# Patient Record
Sex: FEMALE | Race: WHITE
[De-identification: names, ages, dates, MRNs, and addresses within clinical notes are randomized per-mention and may not be internally consistent; named-entity substitution may affect disease eponyms.]

---

## 2018-07-09 ENCOUNTER — HOSPITAL ENCOUNTER (EMERGENCY)
Dept: HOSPITAL 33 - ED | Age: 63
Discharge: HOME | End: 2018-07-09
Payer: COMMERCIAL

## 2018-07-09 VITALS — HEART RATE: 73 BPM | SYSTOLIC BLOOD PRESSURE: 154 MMHG | DIASTOLIC BLOOD PRESSURE: 94 MMHG

## 2018-07-09 VITALS — OXYGEN SATURATION: 95 %

## 2018-07-09 DIAGNOSIS — E78.00: ICD-10-CM

## 2018-07-09 DIAGNOSIS — E03.9: ICD-10-CM

## 2018-07-09 DIAGNOSIS — E11.9: ICD-10-CM

## 2018-07-09 DIAGNOSIS — Z79.899: ICD-10-CM

## 2018-07-09 DIAGNOSIS — N13.2: Primary | ICD-10-CM

## 2018-07-09 LAB
ALBUMIN SERPL-MCNC: 4.6 G/DL (ref 3.5–5)
ALP SERPL-CCNC: 65 U/L (ref 38–126)
ALT SERPL-CCNC: 21 U/L (ref 0–35)
ANION GAP SERPL CALC-SCNC: 17.8 MEQ/L (ref 5–15)
AST SERPL QL: 22 U/L (ref 14–36)
BASOPHILS # BLD AUTO: 0.06 10*3/UL (ref 0–0.4)
BASOPHILS NFR BLD AUTO: 0.6 % (ref 0–0.4)
BILIRUB BLD-MCNC: 0.4 MG/DL (ref 0.2–1.3)
BUN SERPL-MCNC: 18 MG/DL (ref 7–17)
CALCIUM SPEC-MCNC: 9.8 MG/DL (ref 8.4–10.2)
CHLORIDE SERPL-SCNC: 104 MMOL/L (ref 98–107)
CO2 SERPL-SCNC: 25 MMOL/L (ref 22–30)
CREAT SERPL-MCNC: 1.09 MG/DL (ref 0.52–1.04)
EOSINOPHIL # BLD AUTO: 0.25 10*3/UL (ref 0–0.5)
GLUCOSE SERPL-MCNC: 166 MG/DL (ref 74–106)
GLUCOSE UR-MCNC: NEGATIVE MG/DL
GRANULOCYTES # BLD AUTO: 7.48 10*3/UL (ref 1.4–6.9)
HCT VFR BLD AUTO: 41 % (ref 35–47)
HGB BLD-MCNC: 13.8 GM/DL (ref 12–16)
LIPASE SERPL-CCNC: 67 U/L (ref 23–300)
LYMPHOCYTES # SPEC AUTO: 1.65 10*3/UL (ref 1–4.6)
MCH RBC QN AUTO: 28.9 PG (ref 26–32)
MCHC RBC AUTO-ENTMCNC: 33.7 G/DL (ref 32–36)
MONOCYTES # BLD AUTO: 0.84 10*3/UL (ref 0–1.3)
NEUTROPHILS NFR BLD AUTO: 72.7 % (ref 36–66)
PLATELET # BLD AUTO: 214 K/MM3 (ref 150–450)
POTASSIUM SERPLBLD-SCNC: 3.7 MMOL/L (ref 3.5–5.1)
PROT SERPL-MCNC: 7.6 G/DL (ref 6.3–8.2)
PROT UR STRIP-MCNC: (no result) MG/DL
RBC # BLD AUTO: 4.77 M/MM3 (ref 4.1–5.4)
SODIUM SERPL-SCNC: 143 MMOL/L (ref 137–145)
WBC # BLD AUTO: 10.3 K/MM3 (ref 4–10.5)

## 2018-07-09 PROCEDURE — 84484 ASSAY OF TROPONIN QUANT: CPT

## 2018-07-09 PROCEDURE — 96374 THER/PROPH/DIAG INJ IV PUSH: CPT

## 2018-07-09 PROCEDURE — 74176 CT ABD & PELVIS W/O CONTRAST: CPT

## 2018-07-09 PROCEDURE — 83690 ASSAY OF LIPASE: CPT

## 2018-07-09 PROCEDURE — 36415 COLL VENOUS BLD VENIPUNCTURE: CPT

## 2018-07-09 PROCEDURE — 36000 PLACE NEEDLE IN VEIN: CPT

## 2018-07-09 PROCEDURE — 96375 TX/PRO/DX INJ NEW DRUG ADDON: CPT

## 2018-07-09 PROCEDURE — 99284 EMERGENCY DEPT VISIT MOD MDM: CPT

## 2018-07-09 PROCEDURE — 80053 COMPREHEN METABOLIC PANEL: CPT

## 2018-07-09 PROCEDURE — 93005 ELECTROCARDIOGRAM TRACING: CPT

## 2018-07-09 PROCEDURE — 87186 SC STD MICRODIL/AGAR DIL: CPT

## 2018-07-09 PROCEDURE — 85025 COMPLETE CBC W/AUTO DIFF WBC: CPT

## 2018-07-09 PROCEDURE — 87086 URINE CULTURE/COLONY COUNT: CPT

## 2018-07-09 PROCEDURE — 87077 CULTURE AEROBIC IDENTIFY: CPT

## 2018-07-09 PROCEDURE — 81000 URINALYSIS NONAUTO W/SCOPE: CPT

## 2018-07-09 NOTE — ERPHSYRPT
- History of Present Illness


Time Seen by Provider: 07/09/18 15:45


Historian: patient


Patient Subjective Stated Complaint: pt reports rosio 30 min ago sudden onset of 

right sided abd pain-denies vomiting but states that she feels like she could 

but doesn't usually vomit-denies difficulty with urination or bowels


Triage Nursing Assessment: pt pink warm and dry-alert-ambulatory with no 

difficulty-abd soft and tender to palp-pt states pain does not change rather 

there is palp or not-denies rebound tenderness-resp easy and nonlabored


Physician History: 





mild to mod rlq ache pain for one hour, nonrad, no injury, no fever, no emesis


Allergies/Adverse Reactions: 








codeine Allergy (Mild, Verified 07/09/18 15:33)


 Hives





Home Medications: 








Adalimumab [Humira Pen] 0.8 ml IM WEEKLY 07/09/18 [History]


Aspirin 81 gm Chew*** [Baby Aspirin 81 mg Chew***] 81 mg PO DAILY 07/09/18 [

History]


Levothyroxine Sodium [Synthroid] 125 mcg PO DAILY 07/09/18 [History]


Loratadine 10 mg PO DAILY 07/09/18 [History]


Losartan/Hydrochlorothiazide [Losartan-Hctz 50-12.5 mg Tab] 1 tab PO DAILY 07/09 /18 [History]


Metoprolol Tartrate 25 mg PO DAILY 07/09/18 [History]


Pravastatin Sodium 40 mg PO DAILY 07/09/18 [History]





Hx Tetanus, Diphtheria Vaccination/Date Given: No


Hx Influenza Vaccination/Date Given: Yes


Hx Pneumococcal Vaccination/Date Given: No


Immunizations Up to Date: Yes





- Review of Systems


Constitutional: No Fever


Eyes: No Eye Redness


Ears, Nose, & Throat: No Mouth Pain


Respiratory: No Dyspnea


Cardiac: No Chest Pain


Abdominal/Gastrointestinal: Abdominal Pain, No Vomiting


Genitourinary Symptoms: No Dysuria


Musculoskeletal: No Back Pain


Skin: No Rash


Neurological: No Headache





- Past Medical History


Pertinent Past Medical History: Yes


Cardiac History: High Cholesterol, Other


Endocrine Medical History: Diabetes Type II, Hypothyroidism





- Past Surgical History


Past Surgical History: Yes


Female Surgical History: Hysterectomy





- Social History


Smoking Status: Never smoker


Exposure to second hand smoke: No


Drug Use: none


Patient Lives Alone: No





- Female History


Hx Last Menstrual Period: hysterectomy


Hx Pregnant Now: No





- Nursing Vital Signs


Nursing Vital Signs: 


 Initial Vital Signs











Temperature  98.9 F   07/09/18 15:29


 


Pulse Rate  87   07/09/18 15:29


 


Respiratory Rate  18   07/09/18 15:29


 


Blood Pressure  169/94   07/09/18 15:29


 


O2 Sat by Pulse Oximetry  94 L  07/09/18 15:29








 Pain Scale











Pain Intensity                 4

















- Physical Exam


General Appearance: no apparent distress


Eye Exam: PERRL/EOMI, eyes nml inspection


Ears, Nose, Throat Exam: moist mucous membranes


Neck Exam: normal inspection


Respiratory Exam: normal breath sounds


Cardiovascular Exam: regular rate/rhythm


Gastrointestinal/Abdomen Exam: soft, tenderness, No distention, No pulsatile 

mass, No rebound


Extremity Exam: normal inspection


Neurologic Exam: alert, oriented x 3, cooperative


Skin Exam: warm, dry


**SpO2 Interpretation**: normal


SpO2: 94


Oxygen Delivery: Room Air





- Course


Nursing assessment & vital signs reviewed: Yes


EKG Interpreted by Me: Other (nsr 76, no stemi)





- CT Exams


  ** Abdomen/Pelvis


CT Interpretation: Discussed w/radiologist, Other (right hydronephrosis and 

1.5mm stone at right uvj)


Ordered Tests: 


 Active Orders 24 hr











 Category Date Time Status


 


 EKG-ER Only STAT Care  07/09/18 15:47 Active


 


 IV Insertion STAT Care  07/09/18 15:43 Active


 


 ABDOMEN AND PELVIS W/0 CONTRAS [CT] Stat Exams  07/09/18 15:43 Completed


 


 CBC W DIFF Stat Lab  07/09/18 16:18 Completed


 


 CMP Stat Lab  07/09/18 16:18 Completed


 


 CULTURE,URINE Stat Lab  07/09/18 16:02 Received


 


 LIPASE Stat Lab  07/09/18 16:18 Completed


 


 TROPONIN Q3H Lab  07/09/18 16:18 Completed


 


 TROPONIN Q3H Lab  07/09/18 19:00 Ordered


 


 TROPONIN Q3H Lab  07/09/18 22:00 Ordered


 


 UA W/ MICROSCOPIC Stat Lab  07/09/18 16:02 Completed








Medication Summary














Discontinued Medications














Generic Name Dose Route Start Last Admin





  Trade Name Freq  PRN Reason Stop Dose Admin


 


Sodium Chloride  1,000 mls @ 999 mls/hr  07/09/18 15:52  07/09/18 16:02





  Sodium Chloride 0.9% 1000 Ml  IV  07/09/18 16:52  999 mls/hr





  .Q1H1M STA   Administration





     





     





     





     


 


Sodium Chloride  Confirm  07/09/18 15:53  





  Sodium Chloride 0.9% 1000 Ml  Administered  07/09/18 15:54  





  Dose   





  1,000 mls @ ud   





  .ROUTE   





  .STK-MED ONE   





     





     





     





     


 


Ketorolac Tromethamine  30 mg  07/09/18 15:43  07/09/18 16:02





  Toradol 30 Mg Injection***  IV  07/09/18 15:44  30 mg





  STAT ONE   Administration





     





     





     





     


 


Ketorolac Tromethamine  Confirm  07/09/18 15:52  





  Toradol 30 Mg Injection***  Administered  07/09/18 15:53  





  Dose   





  30 mg   





  .ROUTE   





  .STK-MED ONE   





     





     





     





     


 


Ondansetron HCl  4 mg  07/09/18 15:52  07/09/18 16:02





  Zofran 4 Mg/2 Ml Vial**  IV  07/09/18 15:53  4 mg





  STAT ONE   Administration





     





     





     





     


 


Ondansetron HCl  Confirm  07/09/18 15:52  





  Zofran 4 Mg/2 Ml Vial**  Administered  07/09/18 15:53  





  Dose   





  4 mg   





  .ROUTE   





  .STK-MED ONE   





     





     





     





     











Lab/Rad Data: 


 Laboratory Result Diagrams





 07/09/18 16:18 





 07/09/18 16:18 





 Laboratory Results











  07/09/18 07/09/18 07/09/18 Range/Units





  16:18 16:18 16:18 


 


WBC    10.3  (4.0-10.5)  K/mm3


 


RBC    4.77  (4.1-5.4)  M/mm3


 


Hgb    13.8  (12.0-16.0)  gm/dl


 


Hct    41.0  (35-47)  %


 


MCV    86.0  ()  fl


 


MCH    28.9  (26-32)  pg


 


MCHC    33.7  (32-36)  g/dl


 


RDW    13.9  (11.5-14.0)  %


 


Plt Count    214  (150-450)  K/mm3


 


MPV    10.4 H  (6-9.5)  fl


 


Gran %    72.7 H  (36.0-66.0)  %


 


Eos # (Auto)    0.25  (0-0.5)  


 


Absolute Lymphs (auto)    1.65  (1.0-4.6)  


 


Absolute Monos (auto)    0.84  (0.0-1.3)  


 


Lymphocytes %    16.1 L  (24.0-44.0)  %


 


Monocytes %    8.2  (0.0-12.0)  %


 


Eosinophils %    2.4  (0.00-5.0)  %


 


Basophils %    0.6  (0.0-0.4)  %


 


Absolute Granulocytes    7.48 H  (1.4-6.9)  


 


Basophils #    0.06  (0-0.4)  


 


Sodium   143   (137-145)  mmol/L


 


Potassium   3.7   (3.5-5.1)  mmol/L


 


Chloride   104   ()  mmol/L


 


Carbon Dioxide   25   (22-30)  mmol/L


 


Anion Gap   17.8 H   (5-15)  MEQ/L


 


BUN   18 H   (7-17)  mg/dL


 


Creatinine   1.09 H   (0.52-1.04)  mg/dL


 


Estimated GFR   54.1   ML/MIN


 


Glucose   166 H   ()  mg/dL


 


Calcium   9.8   (8.4-10.2)  mg/dL


 


Total Bilirubin   0.40   (0.2-1.3)  mg/dL


 


AST   22   (14-36)  U/L


 


ALT   21   (0-35)  U/L


 


Alkaline Phosphatase   65   ()  U/L


 


Troponin I  < 0.012    (0.000-0.034)  ng/mL


 


Serum Total Protein   7.6   (6.3-8.2)  g/dL


 


Albumin   4.6   (3.5-5.0)  g/dL


 


Lipase   67   ()  U/L


 


Ur Collection Type     


 


Urine Color     (YELLOW)  


 


Urine Appearance     (CLEAR)  


 


Urine pH     (5-6)  


 


Ur Specific Gravity     (1.005-1.025)  


 


Urine Protein     (Negative)  


 


Urine Ketones     (NEGATIVE)  


 


Urine Blood     (0-5)  Aniket/ul


 


Urine Nitrite     (NEGATIVE)  


 


Urine Bilirubin     (NEGATIVE)  


 


Urine Urobilinogen     (0-1)  mg/dL


 


Ur Leukocyte Esterase     (NEGATIVE)  


 


Urine Microscopic RBC     (0-2)  /HPF


 


Urine Microscopic WBC     (0-5)  /HPF


 


Ur Epithelial Cells     (FEW)  /HPF


 


Urine Bacteria     (NEGATIVE)  /HPF


 


Urine Culture Reflexed     (NO)  


 


Urine Glucose     (NEGATIVE)  mg/dL


 


Specimen Received     














  07/09/18 Range/Units





  16:02 


 


WBC   (4.0-10.5)  K/mm3


 


RBC   (4.1-5.4)  M/mm3


 


Hgb   (12.0-16.0)  gm/dl


 


Hct   (35-47)  %


 


MCV   ()  fl


 


MCH   (26-32)  pg


 


MCHC   (32-36)  g/dl


 


RDW   (11.5-14.0)  %


 


Plt Count   (150-450)  K/mm3


 


MPV   (6-9.5)  fl


 


Gran %   (36.0-66.0)  %


 


Eos # (Auto)   (0-0.5)  


 


Absolute Lymphs (auto)   (1.0-4.6)  


 


Absolute Monos (auto)   (0.0-1.3)  


 


Lymphocytes %   (24.0-44.0)  %


 


Monocytes %   (0.0-12.0)  %


 


Eosinophils %   (0.00-5.0)  %


 


Basophils %   (0.0-0.4)  %


 


Absolute Granulocytes   (1.4-6.9)  


 


Basophils #   (0-0.4)  


 


Sodium   (137-145)  mmol/L


 


Potassium   (3.5-5.1)  mmol/L


 


Chloride   ()  mmol/L


 


Carbon Dioxide   (22-30)  mmol/L


 


Anion Gap   (5-15)  MEQ/L


 


BUN   (7-17)  mg/dL


 


Creatinine   (0.52-1.04)  mg/dL


 


Estimated GFR   ML/MIN


 


Glucose   ()  mg/dL


 


Calcium   (8.4-10.2)  mg/dL


 


Total Bilirubin   (0.2-1.3)  mg/dL


 


AST   (14-36)  U/L


 


ALT   (0-35)  U/L


 


Alkaline Phosphatase   ()  U/L


 


Troponin I   (0.000-0.034)  ng/mL


 


Serum Total Protein   (6.3-8.2)  g/dL


 


Albumin   (3.5-5.0)  g/dL


 


Lipase   ()  U/L


 


Ur Collection Type  CLEAN CATCH  


 


Urine Color  YELLOW  (YELLOW)  


 


Urine Appearance  CLEAR  (CLEAR)  


 


Urine pH  5.0  (5-6)  


 


Ur Specific Gravity  1.020  (1.005-1.025)  


 


Urine Protein  TRACE  (Negative)  


 


Urine Ketones  NEGATIVE  (NEGATIVE)  


 


Urine Blood  250  (0-5)  Aniket/ul


 


Urine Nitrite  NEGATIVE  (NEGATIVE)  


 


Urine Bilirubin  NEGATIVE  (NEGATIVE)  


 


Urine Urobilinogen  NORMAL  (0-1)  mg/dL


 


Ur Leukocyte Esterase  1+  (NEGATIVE)  


 


Urine Microscopic RBC  10-15  (0-2)  /HPF


 


Urine Microscopic WBC  10-15  (0-5)  /HPF


 


Ur Epithelial Cells  MODERATE  (FEW)  /HPF


 


Urine Bacteria  FEW  (NEGATIVE)  /HPF


 


Urine Culture Reflexed  YES  (NO)  


 


Urine Glucose  NEGATIVE  (NEGATIVE)  mg/dL


 


Specimen Received  7/9/18 1600  














- Progress


Progress: improved


Progress Note: 





07/09/18 17:49


see your doctor, return if worse


Counseled pt/family regarding: lab results, diagnosis, need for follow-up, rad 

results





- Departure


Time of Disposition: 17:49


Departure Disposition: Home


Clinical Impression: 


Hydronephrosis


Qualifiers:


 Hydronephrosis type: with renal calculous obstruction Qualified Code(s): N13.2 

- Hydronephrosis with renal and ureteral calculous obstruction





Condition: Stable


Critical Care Time: No


Referrals: 


CLARIBEL ALEXANDRE NP [Primary Care Provider] - 


Additional Instructions: 


call your doctor for Urology referral


Prescriptions: 


Ondansetron ODT 4 MG*** [Zofran Odt 4 mg] 1 tab PO Q6H PRN PRN #10 tab.rapdis


 PRN Reason: Nausea/Vomiting


Cephalexin Mh 500 mg** [Keflex 500 mg] 1 cap PO QID #40 capsule


Tamsulosin HCl 0.4 mg*** [Flomax 0.4 MG] 1 cap PO HS #7 cap

## 2018-07-09 NOTE — XRAY
Exam: CT of the abdomen and pelvis without IV contrast from 7/9/2018.

       CTDI:  26.74



Comparison: None.



Indication: Right lower quadrant/flank pain, nausea and vomiting, symptoms

started this afternoon.  The patient is status post hysterectomy.



Technique: Non-IV contrast axial images were obtained through the abdomen and

pelvis.  Reconstructed coronal and sagittal images were created and reviewed.



Findings: The lung bases reveals some minimal compression atelectatic changes

at the posterior lateral left lung base.  No active lung disease is seen.

However, there appears to be a small pericardial effusion, best seen on axial

images #3 and #4.  Correlate clinically.



The right kidney reveals some asymmetric mild hydronephrosis and hydroureter

down to the ureterovesical junction.  Interestingly, a stone is not evident

seen on the axial images, but I do detect a tiny approximately 1.5 mm in

diameter obstructing stone within the distal right ureter at the

ureterovesical junction on coronal image #97 and sagittal image #62.  These

findings are consistent with acute obstructive uropathy.  The left ureter

appears unremarkable.



The remainder of the right kidney is remarkable for a 3.0 cm cyst within the

lower pole.  I also note a.  2.45 cm in diameter cyst at the lateral margin of

lower pole of the left kidney and a small 0.7 cm in diameter apparent

hyperdense cyst at the lower pole of the left kidney.  No other renal or

ureteral calculi are seen.  The urinary bladder is almost empty.



The liver is remarkable for prominent inferior extension of the right lobe

(Riedel's lobe) representing a normal variant.  No definite focal hepatic mass

or intrahepatic biliary duct distention is seen.  Assessment of the solid

organs is limited on a non-IV contrast study only.  The gallbladder is

partially contracted.  No dense calcifications are seen within the gallbladder.



The spleen measures 13.2 cm in greatest length on sagittal image #128.  This

is borderline enlarged.  No focal splenic mass is seen.  Secretion/fluid is

seen within the stomach.  The pancreas appears unremarkable.  I believe there

is a 1.1 cm partially calcified splenic artery aneurysm within the left upper

quadrant on axial images #22 through #24.  The adrenal glands appear

unremarkable.



Mild atherosclerotic vascular calcification is seen within the abdominal

aorta, origin of the celiac axis and SMA, and origin of both renal arteries,

left greater than right.  No abdominal aortic aneurysm or abnormal

retroperitoneal lymphadenopathy is seen.  No free intraperitoneal air is

identified.  The anterior abdominal wall appears intact.



The appendix appears unremarkable within the right lower quadrant.  No right

lower quadrant inflammation is seen.  The uterus is surgically absent.  No

enlarged pelvic lymph nodes or free fluid is seen.  I see no evidence of bowel

distention or obstruction.



The skeleton reveals no fracture or aggressive bone lesion.  There appears to

be a small bone island within the T10 vertebral body.  Mild lower lumbar facet

joint arthropathy is seen at L4-L5 and L5-S1.  Mild degenerative changes are

seen at the inferior aspect of both sacroiliac joints, left greater than

right, manifested by spurring and sclerosis on both sides of each SI joint.

However, no significant SI joint narrowing is seen.



Impression:



1.  There appears to be a 1.5 mm in diameter obstructing stone at the right

ureterovesical junction causing mild right-sided hydronephrosis and

hydroureter.  I believe this is the cause of the patient's symptoms.



2.  Mild pericardial effusion, best seen on axial images #3 and #4.



3.  The appendix appears unremarkable.



4.  No other acute intra-abdominal or pelvic process seen.



5.  Other incidental findings are seen such as bilateral renal cortical cysts,

a small calcified splenic artery aneurysm, and some atherosclerotic vascular

calcification.  The uterus is surgically absent.

## 2021-10-15 ENCOUNTER — HOSPITAL ENCOUNTER (EMERGENCY)
Dept: HOSPITAL 33 - ED | Age: 66
Discharge: ANOTHER HOSPITAL | End: 2021-10-15
Payer: COMMERCIAL

## 2021-10-15 VITALS — SYSTOLIC BLOOD PRESSURE: 142 MMHG | DIASTOLIC BLOOD PRESSURE: 61 MMHG | OXYGEN SATURATION: 97 % | HEART RATE: 59 BPM

## 2021-10-15 DIAGNOSIS — N39.0: ICD-10-CM

## 2021-10-15 DIAGNOSIS — Z79.899: ICD-10-CM

## 2021-10-15 DIAGNOSIS — N17.9: Primary | ICD-10-CM

## 2021-10-15 LAB
ALBUMIN SERPL-MCNC: 3.8 G/DL (ref 3.5–5)
ALP SERPL-CCNC: 54 U/L (ref 38–126)
ALT SERPL-CCNC: 8 U/L (ref 0–35)
AMOURPHOUS CRYSTAL: (no result) /HPF
AMYLASE SERPL-CCNC: 136 U/L (ref 30–110)
ANION GAP SERPL CALC-SCNC: 19.5 MEQ/L (ref 5–15)
AST SERPL QL: 14 U/L (ref 14–36)
BASOPHILS # BLD AUTO: 0.03 10*3/UL (ref 0–0.4)
BASOPHILS NFR BLD AUTO: 0.3 % (ref 0–0.4)
BILIRUB BLD-MCNC: 0.3 MG/DL (ref 0.2–1.3)
BUN SERPL-MCNC: 92 MG/DL (ref 7–17)
CALCIUM SPEC-MCNC: 8.9 MG/DL (ref 8.4–10.2)
CHLORIDE SERPL-SCNC: 111 MMOL/L (ref 98–107)
CO2 SERPL-SCNC: 13 MMOL/L (ref 22–30)
CREAT SERPL-MCNC: 7.26 MG/DL (ref 0.52–1.04)
EOSINOPHIL # BLD AUTO: 0.19 10*3/UL (ref 0–0.5)
GFR SERPLBLD BASED ON 1.73 SQ M-ARVRAT: 6 ML/MIN
GLUCOSE SERPL-MCNC: 87 MG/DL (ref 74–106)
GLUCOSE UR-MCNC: 50 MG/DL
HCT VFR BLD AUTO: 33 % (ref 35–47)
HGB BLD-MCNC: 10.7 GM/DL (ref 12–16)
LIPASE SERPL-CCNC: 215 U/L (ref 23–300)
LYMPHOCYTES # SPEC AUTO: 1.19 10*3/UL (ref 1–4.6)
MCH RBC QN AUTO: 27.9 PG (ref 26–32)
MCHC RBC AUTO-ENTMCNC: 32.4 G/DL (ref 32–36)
MONOCYTES # BLD AUTO: 0.57 10*3/UL (ref 0–1.3)
PLATELET # BLD AUTO: 252 K/MM3 (ref 150–450)
POTASSIUM SERPLBLD-SCNC: 4.2 MMOL/L (ref 3.5–5.1)
PROT SERPL-MCNC: 6.9 G/DL (ref 6.3–8.2)
PROT UR STRIP-MCNC: 30 MG/DL
RBC # BLD AUTO: 3.83 M/MM3 (ref 4.1–5.4)
RBC #/AREA URNS HPF: (no result) /HPF (ref 0–2)
SODIUM SERPL-SCNC: 140 MMOL/L (ref 137–145)
T4 SERPL-MCNC: 8.54 UG/DL (ref 5.53–10.96)
WBC # BLD AUTO: 9.6 K/MM3 (ref 4–10.5)
WBC #/AREA URNS HPF: (no result) /HPF (ref 0–5)

## 2021-10-15 PROCEDURE — 96374 THER/PROPH/DIAG INJ IV PUSH: CPT

## 2021-10-15 PROCEDURE — 96360 HYDRATION IV INFUSION INIT: CPT

## 2021-10-15 PROCEDURE — 87086 URINE CULTURE/COLONY COUNT: CPT

## 2021-10-15 PROCEDURE — 82150 ASSAY OF AMYLASE: CPT

## 2021-10-15 PROCEDURE — 80053 COMPREHEN METABOLIC PANEL: CPT

## 2021-10-15 PROCEDURE — 81001 URINALYSIS AUTO W/SCOPE: CPT

## 2021-10-15 PROCEDURE — 84436 ASSAY OF TOTAL THYROXINE: CPT

## 2021-10-15 PROCEDURE — 36415 COLL VENOUS BLD VENIPUNCTURE: CPT

## 2021-10-15 PROCEDURE — 74176 CT ABD & PELVIS W/O CONTRAST: CPT

## 2021-10-15 PROCEDURE — 93005 ELECTROCARDIOGRAM TRACING: CPT

## 2021-10-15 PROCEDURE — 83690 ASSAY OF LIPASE: CPT

## 2021-10-15 PROCEDURE — 83605 ASSAY OF LACTIC ACID: CPT

## 2021-10-15 PROCEDURE — 36000 PLACE NEEDLE IN VEIN: CPT

## 2021-10-15 PROCEDURE — 84484 ASSAY OF TROPONIN QUANT: CPT

## 2021-10-15 PROCEDURE — 84443 ASSAY THYROID STIM HORMONE: CPT

## 2021-10-15 PROCEDURE — 99285 EMERGENCY DEPT VISIT HI MDM: CPT

## 2021-10-15 PROCEDURE — 96365 THER/PROPH/DIAG IV INF INIT: CPT

## 2021-10-15 PROCEDURE — 96361 HYDRATE IV INFUSION ADD-ON: CPT

## 2021-10-15 PROCEDURE — 85025 COMPLETE CBC W/AUTO DIFF WBC: CPT

## 2021-10-15 NOTE — XRAY
Indication: Nausea and vomiting 3-4 weeks.  Renal failure.



Multiple contiguous axial images obtained through the abdomen and pelvis

without contrast.



Comparison: July 9, 2018.



Lung bases again not demonstrates mild scattered fibrosis/scarring.  No

infiltrate or effusion.  Heart not enlarged.  Again small hiatal hernia.



Noncontrasted stomach and bowel loops appear nonobstructed.  Appendix is now

prominent up to 9 mm in diameter concerning for mild/early appendicitis.  No

free fluid/air.  There remains grossly stable bilateral renal cysts without

hydronephrosis and incidental hysterectomy.



Remaining liver, gallbladder, pancreas, spleen, adrenal glands, kidneys,

ureters, and bladder are unremarkable for noncontrast exam.  Again mild

scattered aortoiliac calcifications without AAA.  Stable 1 cm splenic artery

calcified aneurysm.



Osseous structures intact again with minimal degenerative changes to the spine

and incidental tiny T10 bone island.



Impression:

1.  New prominent appendix.  Rule out mild/early appendicitis.

2.  Again incidental small hiatal hernia, bilateral renal cysts,

arteriosclerotic calcifications, and chronic bony findings.

## 2021-10-15 NOTE — ERPHSYRPT
- History of Present Illness


Time Seen by Provider: 10/15/21 11:36


Source: patient


Exam Limitations: no limitations


Physician History: 





This is a 66-year-old white female who is obese and is a patient of nurse 

damien Doran and presents with 3-week history of intermittent nausea and 

vomiting symptoms.  She had no diarrhea.  She denies chest pain.  She has some 

epigastric discomfort which seems to have improved with the use of proton pump 

inhibitor omeprazole that the patient started Tuesday prior to this evaluation. 

However she still has been having episodes of nausea and vomiting.  Lab work was

performed on 10/12/2021 which was reported to the patient today showing 

"possible acute renal failure" per the patient.  Patient also had a negative 

Covid 19 test that was performed and read out on 10/12/2021.  Patient has a 

history of hypothyroidism, elevated cholesterol and hypertension.  Patient has 

no shortness of breath.  She has had no fevers.  She has no cough and no sore 

throat.  Patient states she actually feels pretty good at this time because she 

took her medicine this morning.  She was told to come to the emergency 

department for evaluation by nurse damien Doran's office because of 

abnormal labs.  Patient has no history of acute renal failure episodes in the 

past.  She does not see a nephrologist.


Timing/Duration: week(s) (3)


Severity: mild


Modifying Factors: Improves With: medication


Associated Symptoms: nausea, vomiting, abdominal pain (Epigastric)


Allergies/Adverse Reactions: 








codeine Allergy (Mild, Verified 10/15/21 11:35)


   Hives





Home Medications: 








Aspirin 81 gm Chew*** [Baby Aspirin 81 mg Chew***] 81 mg PO DAILY 07/09/18 

[History]


Levothyroxine Sodium [Synthroid] 125 mcg PO DAILY 07/09/18 [History]


Loratadine 10 mg PO DAILY 07/09/18 [History]


Losartan/Hydrochlorothiazide [Losartan-Hctz 50-12.5 mg Tab] 1 tab PO DAILY 

07/09/18 [History]


Metoprolol Tartrate 25 mg PO DAILY 07/09/18 [History]


Pravastatin Sodium 40 mg PO DAILY 07/09/18 [History]


Omeprazole 40 mg PO DAILY 10/15/21 [History]


Sucralfate 1 gm*** [Carafate 1 GM***] 1 gm PO TID 10/15/21 [History]





Hx Tetanus, Diphtheria Vaccination/Date Given: No


Hx Influenza Vaccination/Date Given: Yes


Hx Pneumococcal Vaccination/Date Given: No





Travel Risk





- International Travel


Have you traveled outside of the country in past 3 weeks: No





- Coronavirus Screening


Are you exhibiting any of the following symptoms?: No


Close contact with a COVID-19 positive Pt in past 14-21 Days: No





- Review of Systems


Constitutional: No Symptoms


Eyes: No Symptoms


Ears, Nose, & Throat: No Symptoms


Respiratory: No Symptoms


Cardiac: No Symptoms


Abdominal/Gastrointestinal: Abdominal Pain (Epigastric), Nausea, Vomiting


Genitourinary Symptoms: No Symptoms


Musculoskeletal: No Symptoms


Skin: No Symptoms


Neurological: No Symptoms


Psychological: No Symptoms


Endocrine: No Symptoms


Hematologic/Lymphatic: No Symptoms


Immunological/Allergic: No Symptoms


All Other Systems: Reviewed and Negative





- Past Medical History


Pertinent Past Medical History: Yes


Cardiac History: High Cholesterol, Other


Endocrine Medical History: Diabetes Type II, Hypothyroidism





- Past Surgical History


Past Surgical History: Yes


Female Surgical History: Hysterectomy





- Social History


Smoking Status: Never smoker


Exposure to second hand smoke: No


Drug Use: none


Patient Lives Alone: No





- Nursing Vital Signs


Nursing Vital Signs: 


                               Initial Vital Signs











Temperature  96.8 F   10/15/21 11:36


 


Pulse Rate  64   10/15/21 11:36


 


Respiratory Rate  18   10/15/21 11:36


 


Blood Pressure  148/77   10/15/21 11:36


 


O2 Sat by Pulse Oximetry  97   10/15/21 11:36








                                   Pain Scale











Pain Intensity                 0

















- Physical Exam


General Appearance: no apparent distress, alert, anxiety, obese


Eye Exam: PERRL/EOMI, eyes nml inspection


Ears, Nose, Throat Exam: normal ENT inspection, moist mucous membranes


Neck Exam: normal inspection, non-tender, supple, full range of motion


Respiratory Exam: normal breath sounds, lungs clear, airway intact, No chest 

tenderness, No respiratory distress


Cardiovascular Exam: regular rate/rhythm, normal heart sounds, normal peripheral

 pulses


Gastrointestinal/Abdomen Exam: soft, normal bowel sounds, tenderness (Mild 

epigastric tenderness to palpation), guarding, No rebound


Pelvic Exam: not done


Rectal Exam: not done


Back Exam: normal inspection, normal range of motion, No CVA tenderness, No 

vertebral tenderness


Extremity Exam: normal inspection, normal range of motion, pelvis stable


Neurologic Exam: alert, oriented x 3, cooperative, CNs II-XII nml as tested, nor

mal mood/affect, nml cerebellar function, nml station & gait, sensation nml


Skin Exam: normal color, warm, dry


Lymphatic Exam: No adenopathy


**SpO2 Interpretation**: normal


O2 Delivery: Room Air





- Course


Nursing assessment & vital signs reviewed: Yes


EKG Interpreted by Me: RATE (61), Sinus Rhythm, NORMAL AXIS, NORMAL INTERVALS, 

NORMAL QRS, NORMAL ST-T, Other (No acute ischemic changes on today's EKG.  There

 are no changes when compared to EKG dated 7/9/2018.)


Ordered Tests: 


                               Active Orders 24 hr











 Category Date Time Status


 


 EKG-ER Only STAT Care  10/15/21 11:46 Active


 


 IV Insertion STAT Care  10/15/21 11:46 Active


 


 ABDOMEN AND PELVIS W/0 CONTRAS [CT] Stat Exams  10/15/21 11:46 Completed


 


 AMYLASE Stat Lab  10/15/21 13:30 Completed


 


 CBC W DIFF Stat Lab  10/15/21 11:46 Completed


 


 CMP Stat Lab  10/15/21 13:30 Completed


 


 CULTURE,URINE Stat Lab  10/15/21 11:51 Received


 


 LIPASE Stat Lab  10/15/21 13:30 Completed


 


 Lactic Acid Stat Lab  10/15/21 13:12 Completed


 


 T4 (Thyroxine) Stat Lab  10/15/21 13:30 Completed


 


 TROPONIN Q3H Lab  10/15/21 13:30 Completed


 


 TROPONIN Q3H Lab  10/16/21 00:00 Ordered


 


 TSH [TSH, 3RD Generation] Stat Lab  10/15/21 13:30 Completed


 


 UA W/RFX UR CULTURE Stat Lab  10/15/21 11:51 Completed








Medication Summary














Discontinued Medications














Generic Name Dose Route Start Last Admin





  Trade Name Kylie  PRN Reason Stop Dose Admin


 


Sodium Chloride  1,000 mls @ 999 mls/hr  10/15/21 11:46  10/15/21 12:55





  Sodium Chloride 0.9% 1000 Ml  IV  10/15/21 12:46  Infused





  .Q1H1M STA   Infusion


 


Sodium Chloride  Confirm  10/15/21 11:52 





  Sodium Chloride 0.9% 1000 Ml  Administered  10/15/21 11:53 





  Dose  





  1,000 mls @ ud  





  .ROUTE  





  .STK-MED ONE  


 


Ceftriaxone Sodium/Dextrose  1 g in 50 mls @ 100 mls/hr  10/15/21 13:25  

10/15/21 14:35





  Rocephin 1 Gm-D5w 50 Ml Bag**  IV  10/15/21 13:54  Infused





  STAT STA   Infusion


 


Ceftriaxone Sodium/Dextrose  Confirm  10/15/21 13:40 





  Rocephin 1 Gm-D5w 50 Ml Bag**  Administered  10/15/21 13:41 





  Dose  





  1 g in 50 mls @ ud  





  IV  





  .STK-MED ONE  


 


Ondansetron HCl  4 mg  10/15/21 11:46  10/15/21 11:54





  Ondansetron Hcl 4 Mg/2 Ml Vial  IV  10/15/21 11:47  4 mg





  STAT ONE   Administration


 


Ondansetron HCl  Confirm  10/15/21 11:52 





  Ondansetron Hcl 4 Mg/2 Ml Vial  Administered  10/15/21 11:53 





  Dose  





  4 mg  





  .ROUTE  





  .STK-MED ONE  











Lab/Rad Data: 


                           Laboratory Result Diagrams





                                 10/15/21 11:46 





                                 10/15/21 13:30 





                               Laboratory Results











  10/15/21 10/15/21 10/15/21 Range/Units





  13:30 13:30 13:30 


 


WBC     (4.0-10.5)  K/mm3


 


RBC     (4.1-5.4)  M/mm3


 


Hgb     (12.0-16.0)  gm/dl


 


Hct     (35-47)  %


 


MCV     ()  fl


 


MCH     (26-32)  pg


 


MCHC     (32-36)  g/dl


 


RDW     (11.5-14.0)  %


 


Plt Count     (150-450)  K/mm3


 


MPV     (7.5-11.0)  fl


 


Gran %     (36.0-66.0)  %


 


Eos # (Auto)     (0-0.5)  


 


Absolute Lymphs (auto)     (1.0-4.6)  


 


Absolute Monos (auto)     (0.0-1.3)  


 


Lymphocytes %     (24.0-44.0)  %


 


Monocytes %     (0.0-12.0)  %


 


Eosinophils %     (0.00-5.0)  %


 


Basophils %     (0.0-0.4)  %


 


Absolute Granulocytes     (1.4-6.9)  


 


Basophils #     (0-0.4)  


 


Sodium   140   (137-145)  mmol/L


 


Potassium   4.2   (3.5-5.1)  mmol/L


 


Chloride   111 H   ()  mmol/L


 


Carbon Dioxide   13 L*   (22-30)  mmol/L


 


Anion Gap   19.5 H   (5-15)  MEQ/L


 


BUN   92 H   (7-17)  mg/dL


 


Creatinine   7.26 H   (0.52-1.04)  mg/dL


 


Estimated GFR   6.0   ML/MIN


 


Glucose   87   ()  mg/dL


 


Lactic Acid     (0.4-2.0)  


 


Calcium   8.9   (8.4-10.2)  mg/dL


 


Total Bilirubin   0.30   (0.2-1.3)  mg/dL


 


AST   14   (14-36)  U/L


 


ALT   8   (0-35)  U/L


 


Alkaline Phosphatase   54   ()  U/L


 


Troponin I  < 0.012    (0.000-0.034)  ng/mL


 


Serum Total Protein   6.9   (6.3-8.2)  g/dL


 


Albumin   3.8   (3.5-5.0)  g/dL


 


Amylase   136 H   ()  U/L


 


Lipase   215   ()  U/L


 


Thyroxine (T4)    8.54  (5.53-10.96)  ug/dL


 


TSH 3rd Generation    0.266 L  (0.47-4.68)  mIU/L


 


Urine Color     (YELLOW)  


 


Urine Appearance     (CLEAR)  


 


Urine pH     (5-6)  


 


Ur Specific Gravity     (1.005-1.025)  


 


Urine Protein     (Negative)  


 


Urine Ketones     (NEGATIVE)  


 


Urine Blood     (0-5)  Aniket/ul


 


Urine Nitrite     (NEGATIVE)  


 


Urine Bilirubin     (NEGATIVE)  


 


Urine Urobilinogen     (0-1)  mg/dL


 


Ur Leukocyte Esterase     (NEGATIVE)  


 


Urine WBC (Auto)     (0-5)  /HPF


 


Urine RBC (Auto)     (0-2)  /HPF


 


U Epithel Cells (Auto)     (FEW)  /HPF


 


Urine Bacteria (Auto)     (NEGATIVE)  /HPF


 


U Non-Squamous Epi Cells     (FEW)  /HPF


 


Amorphous Crystals     (NEGATIVE)  /HPF


 


Urine Culture Reflexed     (NO)  


 


Urine Glucose     (NEGATIVE)  mg/dL














  10/15/21 10/15/21 10/15/21 Range/Units





  13:12 11:51 11:46 


 


WBC    9.6  (4.0-10.5)  K/mm3


 


RBC    3.83 L  (4.1-5.4)  M/mm3


 


Hgb    10.7 L  (12.0-16.0)  gm/dl


 


Hct    33.0 L  (35-47)  %


 


MCV    86.2  ()  fl


 


MCH    27.9  (26-32)  pg


 


MCHC    32.4  (32-36)  g/dl


 


RDW    13.7  (11.5-14.0)  %


 


Plt Count    252  (150-450)  K/mm3


 


MPV    9.4  (7.5-11.0)  fl


 


Gran %    79.4 H  (36.0-66.0)  %


 


Eos # (Auto)    0.19  (0-0.5)  


 


Absolute Lymphs (auto)    1.19  (1.0-4.6)  


 


Absolute Monos (auto)    0.57  (0.0-1.3)  


 


Lymphocytes %    12.4 L  (24.0-44.0)  %


 


Monocytes %    5.9  (0.0-12.0)  %


 


Eosinophils %    2.0  (0.00-5.0)  %


 


Basophils %    0.3  (0.0-0.4)  %


 


Absolute Granulocytes    7.60 H  (1.4-6.9)  


 


Basophils #    0.03  (0-0.4)  


 


Sodium     (137-145)  mmol/L


 


Potassium     (3.5-5.1)  mmol/L


 


Chloride     ()  mmol/L


 


Carbon Dioxide     (22-30)  mmol/L


 


Anion Gap     (5-15)  MEQ/L


 


BUN     (7-17)  mg/dL


 


Creatinine     (0.52-1.04)  mg/dL


 


Estimated GFR     ML/MIN


 


Glucose     ()  mg/dL


 


Lactic Acid  0.8    (0.4-2.0)  


 


Calcium     (8.4-10.2)  mg/dL


 


Total Bilirubin     (0.2-1.3)  mg/dL


 


AST     (14-36)  U/L


 


ALT     (0-35)  U/L


 


Alkaline Phosphatase     ()  U/L


 


Troponin I     (0.000-0.034)  ng/mL


 


Serum Total Protein     (6.3-8.2)  g/dL


 


Albumin     (3.5-5.0)  g/dL


 


Amylase     ()  U/L


 


Lipase     ()  U/L


 


Thyroxine (T4)     (5.53-10.96)  ug/dL


 


TSH 3rd Generation     (0.47-4.68)  mIU/L


 


Urine Color   YELLOW   (YELLOW)  


 


Urine Appearance   CLOUDY   (CLEAR)  


 


Urine pH   5.0   (5-6)  


 


Ur Specific Gravity   1.012   (1.005-1.025)  


 


Urine Protein   30   (Negative)  


 


Urine Ketones   NEGATIVE   (NEGATIVE)  


 


Urine Blood   MODERATE   (0-5)  Aniket/ul


 


Urine Nitrite   NEGATIVE   (NEGATIVE)  


 


Urine Bilirubin   NEGATIVE   (NEGATIVE)  


 


Urine Urobilinogen   NEGATIVE   (0-1)  mg/dL


 


Ur Leukocyte Esterase   LARGE   (NEGATIVE)  


 


Urine WBC (Auto)      (0-5)  /HPF


 


Urine RBC (Auto)   6-10   (0-2)  /HPF


 


U Epithel Cells (Auto)   FEW   (FEW)  /HPF


 


Urine Bacteria (Auto)   MODERATE   (NEGATIVE)  /HPF


 


U Non-Squamous Epi Cells   RARE   (FEW)  /HPF


 


Amorphous Crystals   FEW   (NEGATIVE)  /HPF


 


Urine Culture Reflexed   YES   (NO)  


 


Urine Glucose   50   (NEGATIVE)  mg/dL














- Progress


Progress: improved


Progress Note: 





10/15/21 14:40


Cat scan of the abdomen pelvis without contrast shows a new prominent appendix.


Patient was reexamined.  Patient has no pain in the right lower quadrant on 

examination/palpation.  She has mild discomfort in the epigastric area.


10/15/21 17:27


medical decision making: This patient was discussed with [facility] 

emergency department physician Dr. Dinh. I reviewed the patient history, 

laboratory work-up and x-ray findings with him. He accepts the patient in 

transfer emergency department to emergency department. He wanted an additional 

liter of fluid of normal saline.


Counseled pt/family regarding: lab results, diagnosis, rad results





- Departure


Departure Disposition: Transfer


Clinical Impression: 


 Acute renal failure, Urinary tract infection





Condition: Stable


Critical Care Time: No


Referrals: 


CLARIBEL DORAN NP [Primary Care Provider] -